# Patient Record
Sex: MALE | Race: WHITE | URBAN - METROPOLITAN AREA
[De-identification: names, ages, dates, MRNs, and addresses within clinical notes are randomized per-mention and may not be internally consistent; named-entity substitution may affect disease eponyms.]

---

## 2017-03-16 ENCOUNTER — APPOINTMENT (OUTPATIENT)
Dept: GENERAL RADIOLOGY | Facility: CLINIC | Age: 27
End: 2017-03-16
Attending: EMERGENCY MEDICINE

## 2017-03-16 ENCOUNTER — HOSPITAL ENCOUNTER (EMERGENCY)
Facility: CLINIC | Age: 27
Discharge: HOME | End: 2017-03-16
Attending: EMERGENCY MEDICINE | Admitting: EMERGENCY MEDICINE

## 2017-03-16 VITALS
HEIGHT: 68 IN | OXYGEN SATURATION: 98 % | WEIGHT: 170 LBS | BODY MASS INDEX: 25.76 KG/M2 | TEMPERATURE: 99.2 F | RESPIRATION RATE: 16 BRPM

## 2017-03-16 DIAGNOSIS — S43.102A AC SEPARATION, TYPE 2, LEFT, INITIAL ENCOUNTER: ICD-10-CM

## 2017-03-16 PROCEDURE — 73030 X-RAY EXAM OF SHOULDER: CPT | Mod: LT

## 2017-03-16 PROCEDURE — 99284 EMERGENCY DEPT VISIT MOD MDM: CPT

## 2017-03-16 PROCEDURE — 25000132 ZZH RX MED GY IP 250 OP 250 PS 637: Performed by: EMERGENCY MEDICINE

## 2017-03-16 RX ORDER — HYDROCODONE BITARTRATE AND ACETAMINOPHEN 5; 325 MG/1; MG/1
1-2 TABLET ORAL EVERY 4 HOURS PRN
Qty: 15 TABLET | Refills: 0 | Status: SHIPPED | OUTPATIENT
Start: 2017-03-16

## 2017-03-16 RX ORDER — IBUPROFEN 800 MG/1
800 TABLET, FILM COATED ORAL ONCE
Status: COMPLETED | OUTPATIENT
Start: 2017-03-16 | End: 2017-03-16

## 2017-03-16 RX ORDER — IBUPROFEN 600 MG/1
600 TABLET, FILM COATED ORAL EVERY 6 HOURS PRN
Qty: 30 TABLET | Refills: 1 | Status: SHIPPED | OUTPATIENT
Start: 2017-03-16

## 2017-03-16 RX ADMIN — IBUPROFEN 800 MG: 800 TABLET, FILM COATED ORAL at 21:47

## 2017-03-16 NOTE — LETTER
[facility] EMERGENCY DEPARTMENT  [address]  [facility] [address]  [phone]    Rubén Worthy  [address] [facility] [address]  [phone] (home)     : 1990      To Whom it may concern:    Rubén Worthy was seen in our Emergency Department today, 2017.  I expect his condition to improve over the next 2 days.  He may return to work/school when improved.    Sincerely,        Faisal Weiner

## 2017-03-16 NOTE — ED AVS SNAPSHOT
[facility] Emergency Department    [address]    [facility] [address]    Phone:  [phone]    Fax:  [phone]                                       Rubén Worthy   MRN: [id number]    Department:  [facility] Emergency Department   Date of Visit:  3/16/2017           Patient Information     Date Of Birth          1990        Your diagnoses for this visit were:     AC separation, type 2, left, initial encounter        You were seen by Faiasl Weiner MD.      Follow-up Information     [facility].    Why:  As needed    Contact information:    [address]  [address]  [facility] [address] [phone]        Discharge Instructions         Understanding Shoulder Separation  A shoulder separation is when part of the shoulder blade separates from the collarbone. A separation is not the same as a dislocation. With a dislocation, the bone pulls out of a joint. Shoulder separation is common, especially in active people. It s most often caused by an injury that damages the ligaments around the shoulder joint. The shoulder blade may move downward from the weight of your arm.  The parts of the shoulder joint  The shoulder joint is called the acromioclavicular (AC) joint. It s where the collarbone (clavicle) meets the shoulder blade (scapula). Your shoulder blade connects to your upper arm bone and to your collarbone with ligaments. Ligaments are strong, stretchy tissue. The highest point of your shoulder blade is called the acromion. Two AC ligaments attach the acromion to your collarbone. This is the AC joint. The coracoclavicular (CC) ligament connects part of your shoulder blade to your collarbone.  What causes shoulder separation?  Different types of injuries can lead to shoulder separation. They include:    Falling on the shoulder with your arm close to your body    A direct blow to the  shoulder    Falling onto an outstretched hand    Car accident    Sports injury  Symptoms of shoulder separation  Symptoms can vary a lot depending on how severe the injury is, and can include:    Pain at the top of your shoulder    Pain when touching your AC joint    Swelling    Bruising    Change in shape of your shoulder    Bulge above the shoulder    Shoulder that appears to droop    Collarbone that moves upward    Limited range of motion in your shoulder, such as when you try to lift your arm  Diagnosing shoulder separation  Your healthcare provider will ask about your health history and give you a physical exam. He or she will look at your shoulder and arm and press on your AC joint, which may hurt.  Severe shoulder separation injuries are easy to diagnose with just a physical exam. This is because the shoulder will clearly look deformed. In any case, you will likely need an X-ray of your joint. This will help give more information about the injury. During the exam or X-ray, your healthcare provider may have you hold a weight on the injured side. This can make a deformity easier to see.  An injury to the AC joint is rated based on how severe it is. The types are:    Type I. This injury is the mildest. It may cause only slight pain and swelling.    Type II. The AC ligaments are only partially torn. The bones remain in place.    Type III. Both the AC and CC completely tear. The collarbone and shoulder blade are slightly out of line.    Type VI. This is the most severe. The bones pull out of position even more. Other tissues around the area may be damaged.    4463-3062 [employer]. [address] [place] [address], [place] [id number]. All rights reserved. This information is not intended as a substitute for professional medical care. Always follow your healthcare professional's instructions.          24 Hour Appointment Hotline       To make an appointment at any [facility], call [phone]  ([phone]). If you don't have a family doctor or clinic, we will help you find one. [facility] are conveniently located to serve the needs of you and your family.             Review of your medicines      START taking        Dose / Directions Last dose taken    HYDROcodone-acetaminophen 5-325 MG per tablet   Commonly known as:  NORCO   Dose:  1-2 tablet   Quantity:  15 tablet        Take 1-2 tablets by mouth every 4 hours as needed for pain   Refills:  0        ibuprofen 600 MG tablet   Commonly known as:  ADVIL/MOTRIN   Dose:  600 mg   Quantity:  30 tablet        Take 1 tablet (600 mg) by mouth every 6 hours as needed for moderate pain   Refills:  1                Prescriptions were sent or printed at these locations (2 Prescriptions)                   Other Prescriptions                Printed at Department/Unit printer (2 of 2)         ibuprofen (ADVIL/MOTRIN) 600 MG tablet               HYDROcodone-acetaminophen (NORCO) 5-325 MG per tablet                Procedures and tests performed during your visit     Shoulder XR, G/E 3 views, left      Orders Needing Specimen Collection     None      Pending Results     Date and Time Order Name Status Description    3/16/2017 2129 Shoulder XR, G/E 3 views, left Preliminary             Pending Culture Results     No orders found from 3/14/2017 to 3/17/2017.             Test Results from your hospital stay     3/16/2017  9:57 PM - Interface, Radiant Ib      Narrative     LEFT SHOULDER THREE OR MORE VIEWS   3/16/2017 9:40 PM     INDICATION: Fall 4 days ago with pain.    COMPARISON: None.        Impression     IMPRESSION: No fractures or dislocation identified. Scapular view is  suboptimally positioned to assess glenohumeral alignment but it  appears normal on the other views. If clinically indicated an axillary  view could be performed. Otherwise negative.                Clinical Quality Measure: Blood Pressure Screening     Your blood pressure was checked while  "you were in the emergency department today. The last reading we obtained was    . Please read the guidelines below about what these numbers mean and what you should do about them.  If your systolic blood pressure (the top number) is less than 120 and your diastolic blood pressure (the bottom number) is less than 80, then your blood pressure is normal. There is nothing more that you need to do about it.  If your systolic blood pressure (the top number) is 120-139 or your diastolic blood pressure (the bottom number) is 80-89, your blood pressure may be higher than it should be. You should have your blood pressure rechecked within a year by a primary care provider.  If your systolic blood pressure (the top number) is 140 or greater or your diastolic blood pressure (the bottom number) is 90 or greater, you may have high blood pressure. High blood pressure is treatable, but if left untreated over time it can put you at risk for heart attack, stroke, or kidney failure. You should have your blood pressure rechecked by a primary care provider within the next 4 weeks.  If your provider in the emergency department today gave you specific instructions to follow-up with your doctor or provider even sooner than that, you should follow that instruction and not wait for up to 4 weeks for your follow-up visit.        Thank you for choosing [facility]       Thank you for choosing [facility] for your care. Our goal is always to provide you with excellent care. Hearing back from our patients is one way we can continue to improve our services. Please take a few minutes to complete the written survey that you may receive in the mail after you visit with us. Thank you!        [employer] Information     [facility] lets you send messages to your doctor, view your test results, renew your prescriptions, schedule appointments and more. To sign up, go to [email or web address] . Click on \"Log in\" on the left side of the screen, which will take you to " "the Welcome page. Then click on \"Sign up Now\" on the right side of the page.     You will be asked to enter the access code listed below, as well as some personal information. Please follow the directions to create your username and password.     Your access code is: [email or web address]  Expires: 2017 10:31 PM     Your access code will  in 90 days. If you need help or a new code, please call your [facility] clinic or [phone].        Care EveryWhere ID     This is your Care EveryWhere ID. This could be used by other organizations to access your [facility] medical records  [id number]        After Visit Summary       This is your record. Keep this with you and show to your community pharmacist(s) and doctor(s) at your next visit.                  "

## 2017-03-17 NOTE — DISCHARGE INSTRUCTIONS
Understanding Shoulder Separation  A shoulder separation is when part of the shoulder blade separates from the collarbone. A separation is not the same as a dislocation. With a dislocation, the bone pulls out of a joint. Shoulder separation is common, especially in active people. It s most often caused by an injury that damages the ligaments around the shoulder joint. The shoulder blade may move downward from the weight of your arm.  The parts of the shoulder joint  The shoulder joint is called the acromioclavicular (AC) joint. It s where the collarbone (clavicle) meets the shoulder blade (scapula). Your shoulder blade connects to your upper arm bone and to your collarbone with ligaments. Ligaments are strong, stretchy tissue. The highest point of your shoulder blade is called the acromion. Two AC ligaments attach the acromion to your collarbone. This is the AC joint. The coracoclavicular (CC) ligament connects part of your shoulder blade to your collarbone.  What causes shoulder separation?  Different types of injuries can lead to shoulder separation. They include:    Falling on the shoulder with your arm close to your body    A direct blow to the shoulder    Falling onto an outstretched hand    Car accident    Sports injury  Symptoms of shoulder separation  Symptoms can vary a lot depending on how severe the injury is, and can include:    Pain at the top of your shoulder    Pain when touching your AC joint    Swelling    Bruising    Change in shape of your shoulder    Bulge above the shoulder    Shoulder that appears to droop    Collarbone that moves upward    Limited range of motion in your shoulder, such as when you try to lift your arm  Diagnosing shoulder separation  Your healthcare provider will ask about your health history and give you a physical exam. He or she will look at your shoulder and arm and press on your AC joint, which may hurt.  Severe shoulder separation injuries are easy to diagnose with  just a physical exam. This is because the shoulder will clearly look deformed. In any case, you will likely need an X-ray of your joint. This will help give more information about the injury. During the exam or X-ray, your healthcare provider may have you hold a weight on the injured side. This can make a deformity easier to see.  An injury to the AC joint is rated based on how severe it is. The types are:    Type I. This injury is the mildest. It may cause only slight pain and swelling.    Type II. The AC ligaments are only partially torn. The bones remain in place.    Type III. Both the AC and CC completely tear. The collarbone and shoulder blade are slightly out of line.    Type VI. This is the most severe. The bones pull out of position even more. Other tissues around the area may be damaged.    9632-6782 [employer]. [address] [place] [address]. All rights reserved. This information is not intended as a substitute for professional medical care. Always follow your healthcare professional's instructions.

## 2017-03-17 NOTE — ED PROVIDER NOTES
"  History     Chief Complaint:  Shoulder Pain    HPI  Rubén Worthy is a 26 year old otherwise healthy male who presents to the emergency department today for evaluation of shoulder pain.Pt states he recently moved to the area from [place], and a few days ago slipped on ice and landed on left sholder and heard a pop. Pain has been persistant and has been having diffuculty doing work and presents to ER for evaluation. No head injury, no loss of consciousness.      Allergies:  No Known Drug Allergies     Medications:    The patient is currently on no regular medications.    Past Medical History:    History reviewed. No pertinent past medical history.    Past Surgical History:    History reviewed. No pertinent past surgical history.    Family History:    History reviewed. No pertinent family history.     Social History:  The patient was accompanied to the ED by himself.  Smoking Status: Current  Alcohol Use: No    Review of Systems  NO head injury no loss of consciousness, has pain to the left of the midline of the neck in the \"trap\", and left shoulder all other systems negativef.    Physical Exam   First Vitals:  Heart Rate: 69  Temp: 99.2  F (37.3  C)  Resp: 16  Height: 172.7 cm (5' 8\")  Weight: 77.1 kg (170 lb)  SpO2: 98 %      Physical Exam   Constitutional: He appears well-developed.   Neck: Normal range of motion. Muscular tenderness present. No spinous process tenderness present.   Cardiovascular: Normal rate.    Pulmonary/Chest: Effort normal.   Musculoskeletal:        Left shoulder: He exhibits tenderness and pain. He exhibits no laceration.        Arms:  Nursing note and vitals reviewed.      Emergency Department Course     Imaging:  Radiology findings were communicated with the patient who voiced understanding of the findings.  Shoulder XR G/E 3 Views Left  IMPRESSION: No fractures or dislocation identified. Scapular view is  suboptimally positioned to assess glenohumeral alignment but it  appears normal " on the other views. If clinically indicated an axillary  view could be performed. Otherwise negative.  Report per radiology     Interventions:  2147 Ibuprofen 800mg PO     Emergency Department Course:  Nursing notes and vitals reviewed.  The patient was sent for a Shoulder XR G/E 3 Views Left while in the emergency department, results above.   I performed an exam of the patient as documented above.   I discussed the treatment plan with the patient. They expressed understanding of this plan and consented to discharge. They will be discharged home with instructions for care and follow up. In addition, the patient will return to the emergency department if their symptoms persist, worsen, if new symptoms arise or if there is any concern.  All questions were answered.  I personally reviewed the imaging results with the Patient and answered all related questions prior to discharge.    Impression & Plan      Medical Decision Making:  Pt presents with mechanical fall with  Pop, clinical exam is suspicious for AC separation,w ill place in sling offer medication for pain and follow up with orthopedics if no improvement.      Diagnosis:    ICD-10-CM    1. AC separation, type 2, left, initial encounter S43.102A      Disposition:   Discharged to home with the below prescription     Discharge Medications:  New Prescriptions    HYDROCODONE-ACETAMINOPHEN (NORCO) 5-325 MG PER TABLET    Take 1-2 tablets by mouth every 4 hours as needed for pain    IBUPROFEN (ADVIL/MOTRIN) 600 MG TABLET    Take 1 tablet (600 mg) by mouth every 6 hours as needed for moderate pain       Scribe Disclosure:  I, Eleonora Chen, am serving as a scribe at 9:54 PM on 3/16/2017 to document services personally performed by Faisal Weiner MD, based on my observations and the provider's statements to me.    3/16/2017   [facility] EMERGENCY DEPARTMENT       Faisal Weiner MD  03/17/17 4162

## 2017-03-17 NOTE — ED NOTES
Patient arrives to ED due L shoulder pain. Reports slipping on ICE landing on L shoulder. Reports numbness and tingling, pain worse with ROM.   Last ibuprofen at 0630

## 2018-01-29 NOTE — ED AVS SNAPSHOT
[facility] Emergency Department    [address]    [facility] [address]    Phone:  [phone]    Fax:  [phone]                                       Rubén Worthy   MRN: [id number]    Department:  [facility] Emergency Department   Date of Visit:  3/16/2017           After Visit Summary Signature Page     I have received my discharge instructions, and my questions have been answered. I have discussed any challenges I see with this plan with the nurse or doctor.    ..........................................................................................................................................  Patient/Patient Representative Signature      ..........................................................................................................................................  Patient Representative Print Name and Relationship to Patient    ..................................................               ................................................  Date                                            Time    ..........................................................................................................................................  Reviewed by Signature/Title    ...................................................              ..............................................  Date                                                            Time           Adequate: hears normal conversation without difficulty